# Patient Record
Sex: FEMALE | Race: BLACK OR AFRICAN AMERICAN | Employment: FULL TIME | URBAN - METROPOLITAN AREA
[De-identification: names, ages, dates, MRNs, and addresses within clinical notes are randomized per-mention and may not be internally consistent; named-entity substitution may affect disease eponyms.]

---

## 2023-04-25 ENCOUNTER — HOSPITAL ENCOUNTER (EMERGENCY)
Age: 38
Discharge: HOME | End: 2023-04-25
Attending: EMERGENCY MEDICINE
Payer: COMMERCIAL

## 2023-04-25 VITALS
WEIGHT: 100 LBS | BODY MASS INDEX: 20.99 KG/M2 | HEIGHT: 58 IN | RESPIRATION RATE: 16 BRPM | TEMPERATURE: 98.2 F | SYSTOLIC BLOOD PRESSURE: 121 MMHG | HEART RATE: 90 BPM | DIASTOLIC BLOOD PRESSURE: 81 MMHG | OXYGEN SATURATION: 99 %

## 2023-04-25 DIAGNOSIS — L02.419 AXILLARY ABSCESS: Primary | ICD-10-CM

## 2023-04-25 PROCEDURE — 99283 EMERGENCY DEPT VISIT LOW MDM: CPT

## 2023-04-25 PROCEDURE — 10060 I&D ABSCESS SIMPLE/SINGLE: CPT

## 2023-04-25 PROCEDURE — 2500000003 HC RX 250 WO HCPCS: Performed by: PHYSICIAN ASSISTANT

## 2023-04-25 RX ORDER — LIDOCAINE HYDROCHLORIDE AND EPINEPHRINE 10; 10 MG/ML; UG/ML
20 INJECTION, SOLUTION INFILTRATION; PERINEURAL
Status: COMPLETED | OUTPATIENT
Start: 2023-04-25 | End: 2023-04-25

## 2023-04-25 RX ORDER — IBUPROFEN 800 MG/1
800 TABLET ORAL EVERY 8 HOURS PRN
Qty: 21 TABLET | Refills: 0 | Status: SHIPPED | OUTPATIENT
Start: 2023-04-25 | End: 2023-05-02

## 2023-04-25 RX ORDER — CLINDAMYCIN HYDROCHLORIDE 300 MG/1
300 CAPSULE ORAL 3 TIMES DAILY
Qty: 21 CAPSULE | Refills: 0 | Status: SHIPPED | OUTPATIENT
Start: 2023-04-25 | End: 2023-05-02

## 2023-04-25 RX ADMIN — LIDOCAINE HYDROCHLORIDE,EPINEPHRINE BITARTRATE 20 ML: 10; .01 INJECTION, SOLUTION INFILTRATION; PERINEURAL at 11:27

## 2023-04-25 ASSESSMENT — ENCOUNTER SYMPTOMS
CHEST TIGHTNESS: 0
EYE REDNESS: 0
ABDOMINAL DISTENTION: 0
COUGH: 0
SORE THROAT: 0
DIARRHEA: 0
VOMITING: 0
BACK PAIN: 0
SHORTNESS OF BREATH: 0
RHINORRHEA: 0
NAUSEA: 0

## 2023-04-25 ASSESSMENT — PAIN SCALES - GENERAL: PAINLEVEL_OUTOF10: 10

## 2023-04-25 ASSESSMENT — LIFESTYLE VARIABLES
HOW MANY STANDARD DRINKS CONTAINING ALCOHOL DO YOU HAVE ON A TYPICAL DAY: PATIENT DOES NOT DRINK
HOW OFTEN DO YOU HAVE A DRINK CONTAINING ALCOHOL: NEVER

## 2023-04-25 NOTE — ED PROVIDER NOTES
Emergency Department Provider Note       PCP: None None   Age: 44 y.o. Sex: female     DISPOSITION Decision To Discharge 04/25/2023 11:43:29 AM       ICD-10-CM    1. Axillary abscess  L02.419           Medical Decision Making     Complexity of Problems Addressed:  Acute illness    Data Reviewed and Analyzed:  Category 1:   I independently ordered and reviewed each unique test.         Category 2:       Category 3: Discussion of management or test interpretation. Patient is a 59-year-old female presenting with right axillary abscess ongoing for the last 2 to 3 days. She is afebrile, nontoxic in appearance, vital signs within appropriate limits. Incision and drainage of the abscess was performed at bedside as described in procedure note with copious amount of purulent fluid. Patient did not tolerate packing. Will discharge with Motrin 800 mg for pain as well as 7-day course of clindamycin. Advised use of probiotic while on antibiotics to prevent diarrhea. Discussed wound care as well is what to watch for in regards to spreading infection and reasons to return to the ED. Patient verbalized understanding and is agreeable to plan. Risk of Complications and/or Morbidity of Patient Management:  Prescription drug management performed. History      Marlena Jacome is a 44 y.o. female who presents to the Emergency Department with chief complaint of    Chief Complaint   Patient presents with    Abscess      Pt is a 59-year-old female who presents with complaint of painful lump to the right axilla. She first noticed a lump 2 days ago states that it is slowly gotten bigger more painful hurts to move her arm around. She notes that she did have something like this previously many years ago but that it opened and drained on its own and seemed to resolve. She denies any fever or chills. She denies any history of immunosuppression or diabetes.   She rates her pain currently as a 10 out of 10 and states

## 2023-04-25 NOTE — DISCHARGE INSTRUCTIONS
Warm compresses on and off to the right armpit throughout the next 3 to 4 days, can take Motrin 800 mg every 6-8 hours as needed for pain, clindamycin 3 times daily for antibiotics. Do recommend use of probiotic or yogurt while on antibiotics to prevent diarrhea.   Follow-up with your doctor in 48 hours for reevaluation of the area or return to the ER with any worsening pain increased swelling, high fevers or concern for spreading infection

## 2023-04-25 NOTE — ED NOTES
I have reviewed discharge instructions with the patient. The patient verbalized understanding. Patient left ED via Discharge Method: ambulatory to Home with self. Opportunity for questions and clarification provided. Patient given 2 scripts. To continue your aftercare when you leave the hospital, you may receive an automated call from our care team to check in on how you are doing. This is a free service and part of our promise to provide the best care and service to meet your aftercare needs.  If you have questions, or wish to unsubscribe from this service please call [phone]. Thank you for Choosing our [facility] Emergency Department.        Arita Kawasaki, RN  04/25/23 5030

## 2025-03-23 ENCOUNTER — HOSPITAL ENCOUNTER (EMERGENCY)
Age: 40
Discharge: HOME | End: 2025-03-23
Attending: GENERAL PRACTICE
Payer: COMMERCIAL

## 2025-03-23 VITALS
BODY MASS INDEX: 22.04 KG/M2 | DIASTOLIC BLOOD PRESSURE: 86 MMHG | RESPIRATION RATE: 16 BRPM | OXYGEN SATURATION: 100 % | HEIGHT: 58 IN | WEIGHT: 105 LBS | SYSTOLIC BLOOD PRESSURE: 116 MMHG | TEMPERATURE: 98.4 F | HEART RATE: 110 BPM

## 2025-03-23 DIAGNOSIS — U07.1 COVID-19: Primary | ICD-10-CM

## 2025-03-23 LAB
FLUAV RNA SPEC QL NAA+PROBE: NOT DETECTED
FLUBV RNA SPEC QL NAA+PROBE: NOT DETECTED
SARS-COV-2 RDRP RESP QL NAA+PROBE: DETECTED
SOURCE: ABNORMAL

## 2025-03-23 PROCEDURE — 87635 SARS-COV-2 COVID-19 AMP PRB: CPT

## 2025-03-23 PROCEDURE — 87502 INFLUENZA DNA AMP PROBE: CPT

## 2025-03-23 PROCEDURE — 99283 EMERGENCY DEPT VISIT LOW MDM: CPT

## 2025-03-23 RX ORDER — GUAIFENESIN 600 MG/1
1200 TABLET, EXTENDED RELEASE ORAL 2 TIMES DAILY
Qty: 28 TABLET | Refills: 0 | Status: SHIPPED | OUTPATIENT
Start: 2025-03-23 | End: 2025-03-30

## 2025-03-23 ASSESSMENT — PAIN - FUNCTIONAL ASSESSMENT: PAIN_FUNCTIONAL_ASSESSMENT: 0-10

## 2025-03-23 ASSESSMENT — PAIN DESCRIPTION - LOCATION: LOCATION: GENERALIZED

## 2025-03-23 ASSESSMENT — PAIN SCALES - GENERAL: PAINLEVEL_OUTOF10: 7

## 2025-03-23 NOTE — DISCHARGE INSTRUCTIONS
As we discussed, your workup did not reveal any emergency process here today.  You do have COVID-19.  This is likely causative of your symptoms.  I will start you on Mucinex to help with congestion.  I have placed information in your discharge paperwork in regards to home care as well which you can perform to improve your symptoms.  Please take Tylenol and ibuprofen at home for aches, pains, fevers.  Please hydrate well over the next few days.  I have given information to below for primary care follow-up.  As we discussed, please return to the emergency department for any new, worsening, concerning symptoms.    We would love to help you get a primary care doctor for follow-up after your emergency department visit.    Please call [phone] between 7AM - 6PM Monday to Friday.  A care navigator will be able to assist you with setting up a doctor close to your home.

## 2025-03-23 NOTE — ED PROVIDER NOTES
Emergency Department Provider Note       PCP: None, None   Age: 39 y.o.   Sex: female     DISPOSITION Decision To Discharge 03/23/2025 10:47:19 AM    ICD-10-CM    1. COVID-19  U07.1           Medical Decision Making     In summary, this is a well-appearing nontoxic 39-year-old female coming into the emergency department for complaints of URI symptoms ongoing for the past couple days.  Had concerns for COVID or flu.  Her COVID test is positive here.  Will send home with course of Mucinex.  Have given information and discharge paperwork in regards to home care.  Instructed to use Tylenol and ibuprofen at home for aches, pains, fevers.  Have given primary care information for follow-up as needed.  Findings here today and plan moving forward were discussed with patient at length in which she is in agreement with.  Very strict return precautions were discussed in which she verbalized understanding.  ED Course as of 03/23/25 1048   Sun Mar 23, 2025   1046 COVID-19. [TC]      ED Course User Index  [TC] Cristo Clark APRN - NP     1 acute, uncomplicated illness or injury.  Prescription drug management performed.  Patient was discharged risks and benefits of hospitalization were considered.  Shared medical decision making was utilized in creating the patients health plan today.  I independently ordered and reviewed each unique test.    I reviewed external records: ED visit note from a different ED.   I reviewed external records: provider visit note from PCP.    history provided by patient.  Patient is alert and orient x 4.    I interpreted the swabs.      Exclusion criteria - the patient is NOT to be included for SEP-1 Core Measure due to: Infection is not suspected         History     This is a well-appearing nontoxic 39-year-old female coming the emergency department for complaints of congestion, rhinorrhea, sinus pressure, headache, malaise this been ongoing for the past 2 days per patient report.  She denies fever

## 2025-03-25 ENCOUNTER — HOSPITAL ENCOUNTER (EMERGENCY)
Age: 40
Discharge: HOME | End: 2025-03-25
Payer: COMMERCIAL

## 2025-03-25 VITALS
HEIGHT: 58 IN | OXYGEN SATURATION: 100 % | TEMPERATURE: 98.2 F | DIASTOLIC BLOOD PRESSURE: 86 MMHG | BODY MASS INDEX: 22.04 KG/M2 | WEIGHT: 105 LBS | HEART RATE: 70 BPM | RESPIRATION RATE: 16 BRPM | SYSTOLIC BLOOD PRESSURE: 146 MMHG

## 2025-03-25 DIAGNOSIS — U07.1 COVID: Primary | ICD-10-CM

## 2025-03-25 LAB
SARS-COV-2 RDRP RESP QL NAA+PROBE: DETECTED
SOURCE: ABNORMAL

## 2025-03-25 PROCEDURE — 87635 SARS-COV-2 COVID-19 AMP PRB: CPT

## 2025-03-25 PROCEDURE — 99284 EMERGENCY DEPT VISIT MOD MDM: CPT

## 2025-03-25 PROCEDURE — 96372 THER/PROPH/DIAG INJ SC/IM: CPT

## 2025-03-25 PROCEDURE — 6360000002 HC RX W HCPCS

## 2025-03-25 RX ORDER — DEXAMETHASONE SODIUM PHOSPHATE 10 MG/ML
10 INJECTION, SOLUTION INTRA-ARTICULAR; INTRALESIONAL; INTRAMUSCULAR; INTRAVENOUS; SOFT TISSUE ONCE
Status: COMPLETED | OUTPATIENT
Start: 2025-03-25 | End: 2025-03-25

## 2025-03-25 RX ORDER — ONDANSETRON 4 MG/1
4 TABLET, ORALLY DISINTEGRATING ORAL 3 TIMES DAILY PRN
Qty: 21 TABLET | Refills: 0 | Status: SHIPPED | OUTPATIENT
Start: 2025-03-25

## 2025-03-25 RX ADMIN — DEXAMETHASONE SODIUM PHOSPHATE 10 MG: 10 INJECTION INTRAMUSCULAR; INTRAVENOUS at 09:38

## 2025-03-25 ASSESSMENT — PAIN - FUNCTIONAL ASSESSMENT: PAIN_FUNCTIONAL_ASSESSMENT: 0-10

## 2025-03-25 ASSESSMENT — PAIN SCALES - GENERAL
PAINLEVEL_OUTOF10: 0
PAINLEVEL_OUTOF10: 5

## 2025-03-25 ASSESSMENT — PAIN DESCRIPTION - DESCRIPTORS: DESCRIPTORS: ACHING

## 2025-03-25 ASSESSMENT — PAIN DESCRIPTION - LOCATION: LOCATION: BACK;HEAD;LEG

## 2025-03-25 NOTE — DISCHARGE INSTRUCTIONS
Your COVID test is still positive.  I have extended your work note to include your 5-day quarantine.  Continue to stay hydrated and push fluids.  You can take the nausea medication as needed.  Continue ibuprofen and Tylenol for fevers and bodyaches.    Return to the ED with any chest pain, shortness of breath, high fevers, or any new or worsening symptoms.

## 2025-03-25 NOTE — ED PROVIDER NOTES
Emergency Department Provider Note       PCP: None, None   Age: 39 y.o.   Sex: female     DISPOSITION      ICD-10-CM    1. COVID  U07.1           Medical Decision Making     Patient is a 39-year-old female presenting COVID-positive requesting a work note.  She was diagnosed with COVID at our facility 2 days ago.  She started having symptoms 3 days ago.  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     On presentation, patient is afebrile, vital signs are stable, and she is well-appearing in no acute distress. HEENT exam benign and reassuring. Lungs clear to auscultation in all fields. No nuchal rigidity.     As patients symptoms are improving, her vitals are stable without fever, hypoxia, etc, she is tolerating PO intake, does not have any chest pain or SOB, plan for this patient is continued outpatient management. Will extend patients work note to cover for her 5 day quarantine. Will provide her with dexamethasone for congestion and inflammation and send home with zofran for nausea. Patient was given strict return precautions such as chest pain, shortness of breath, fever, or any new or worsening symptoms.  She verbalized understanding with plan of care and left facility in stable condition.       1 acute complicated illness or injury.  Prescription drug management performed.  Shared medical decision making was utilized in creating the patients health plan today.  I independently ordered and reviewed each unique test.    I reviewed external records: ED visit note from a different ED.   I reviewed external records: provider visit note from PCP.  I reviewed external records: provider visit note from outside specialist.  I reviewed external records: previous lab results from outside ED.                     History     Patient is a 39-year-old female presenting COVID-positive requesting a work